# Patient Record
Sex: MALE | Race: WHITE | NOT HISPANIC OR LATINO | URBAN - METROPOLITAN AREA
[De-identification: names, ages, dates, MRNs, and addresses within clinical notes are randomized per-mention and may not be internally consistent; named-entity substitution may affect disease eponyms.]

---

## 2020-12-04 ENCOUNTER — EMERGENCY (EMERGENCY)
Facility: HOSPITAL | Age: 25
LOS: 1 days | Discharge: HOME | End: 2020-12-04
Attending: EMERGENCY MEDICINE | Admitting: EMERGENCY MEDICINE
Payer: COMMERCIAL

## 2020-12-04 VITALS
TEMPERATURE: 98 F | SYSTOLIC BLOOD PRESSURE: 122 MMHG | DIASTOLIC BLOOD PRESSURE: 75 MMHG | RESPIRATION RATE: 18 BRPM | HEART RATE: 81 BPM | OXYGEN SATURATION: 97 %

## 2020-12-04 VITALS
WEIGHT: 199.96 LBS | HEART RATE: 92 BPM | SYSTOLIC BLOOD PRESSURE: 143 MMHG | RESPIRATION RATE: 18 BRPM | TEMPERATURE: 98 F | OXYGEN SATURATION: 97 % | HEIGHT: 76 IN | DIASTOLIC BLOOD PRESSURE: 92 MMHG

## 2020-12-04 DIAGNOSIS — Z20.6 CONTACT WITH AND (SUSPECTED) EXPOSURE TO HUMAN IMMUNODEFICIENCY VIRUS [HIV]: ICD-10-CM

## 2020-12-04 DIAGNOSIS — Z11.3 ENCOUNTER FOR SCREENING FOR INFECTIONS WITH A PREDOMINANTLY SEXUAL MODE OF TRANSMISSION: ICD-10-CM

## 2020-12-04 LAB — HIV 1 & 2 AB SERPL IA.RAPID: SIGNIFICANT CHANGE UP

## 2020-12-04 PROCEDURE — 99283 EMERGENCY DEPT VISIT LOW MDM: CPT

## 2020-12-04 NOTE — ED PROVIDER NOTE - OBJECTIVE STATEMENT
26 yo M w/ no pertinent PMHx presents to the ED requesting STD/STI testing. Pt has no medical complaints currently, but notes his partner recently tested positive for HIV; last sexual encounter was 10/26 which was unprotected. Denies fever, chills, hematuria, abdominal pain, change in bowel function, flank pain, rash, HA, dizziness.

## 2020-12-04 NOTE — ED PROVIDER NOTE - PATIENT PORTAL LINK FT
You can access the FollowMyHealth Patient Portal offered by [facility] by registering at the following website: [email or web address] By joining [facility]’s FollowMyHealth portal, you will also be able to view your health information using other applications (apps) compatible with our system.

## 2020-12-04 NOTE — ED PROVIDER NOTE - CLINICAL SUMMARY MEDICAL DECISION MAKING FREE TEXT BOX
Pt presents to the ED requesting STD testing s/p exposure. Pt declined physical exam. Will obtain rapid HIV, syphilis screen, and GC culture. Abstinence and safe sex precautions given. Pt presents to the ED requesting STD testing s/p exposure. Pt declined physical exam as he reports no symptoms. Will obtain rapid HIV, syphilis screen, and GC culture. Abstinence and safe sex precautions given. PEP declined as patient reports his last sexual encounter was more than a month ago.

## 2020-12-04 NOTE — ED PROVIDER NOTE - NSFOLLOWUPINSTRUCTIONS_ED_ALL_ED_FT
HIV Antibody Test      Why am I having this test?    The HIV antibody test is used to screen for the human immunodeficiency virus (HIV), the virus that causes AIDS (acquired immunodeficiency syndrome).  The Centers for Disease Control and Prevention (CDC) recommends that everyone between the ages of 13 and 64 have this test at least once. Your health care provider may recommend this test if:  •You are pregnant or planning on becoming pregnant.      •You have had sex with someone who is or might be HIV-positive.      •You have had unprotected sex with more than one partner.      •You are a health worker and were exposed to HIV-infected blood.      •You have ever injected illegal drugs.      •You have ever exchanged sex for money or drugs.      •You have been diagnosed with hepatitis, tuberculosis, or a sexually transmitted infection (STI).      •You are a man who has sex with other men (MSM).      •You are exhibiting symptoms of AIDS.      •You had a blood transfusion before 1985.      It is possible to have HIV without any symptoms. There is no cure for HIV, but starting treatment early helps you stay healthy longer. If you know you have HIV (are HIV-positive), you can also make changes to prevent transferring the virus to other people.      What is being tested?    This test detects the proteins (antibodies) that your body’s defense system (immune system) makes to fight the infection. Having antibodies for HIV does not mean that you have AIDS or will get AIDS. HIV tests can be performed in a clinic, lab, or hospital setting. There are also home testing kits.      What kind of sample is taken?                The HIV antibody test requires either a blood sample or a sample of the fluid from inside your mouth (oral fluid).  •For the blood test, a blood sample is drawn from a vein in your hand or arm or by sticking a finger with a small needle.      •For the test using oral fluid, your upper and lower gums are swabbed.    If you decide to do a home HIV test, follow the package instructions carefully. There are two types of home testing kits:  •Blood test. You will send your test kit that includes a small sample of your blood to the  for processing. You will receive your results from the .    •Oral fluid test. This test is completed at home and usually gives you results in 20–40 minutes.  •Up to 1 in 12 infected people may have a false-negative result with the saliva test. This means that you could have a negative result even if you are infected.          How are the results reported?  •Your test results will be reported as either positive or negative for antibodies of HIV.  •Sometimes, the test results may report that a condition is present when it is not present (false-positive result).      •Sometimes, the test results may report that a condition is not present when it is present (false-negative result).        •Your health care provider will talk to you about doing more tests to confirm your results.        What do the results mean?    •If your HIV antibody test is negative, it means that there were no antibodies in your blood at the time of the test. It is important to know that it can take 1–3 months to develop antibodies after infection. If you may have been infected within the previous 3 months, your health care provider may recommend that you repeat the test after that time period.      •If your HIV antibody test is positive, it means that the test detected antibodies of HIV in your sample. You will need to have another type of test to confirm the results of the initial test. A positive result to the HIV antibody test does not necessarily mean that you will develop AIDS.      Talk with your health care provider about what your results mean.      Questions to ask your health care provider  Ask your health care provider, or the department that is doing the test:  •When will my results be ready?      •How will I get my results?      •What are my treatment options?      •What other tests do I need?      •What are my next steps?        Summary    •The HIV antibody test is used to screen for the human immunodeficiency virus (HIV), the virus that causes AIDS.      •It is possible to have HIV without any symptoms. There is no cure for HIV, but starting treatment early helps you stay healthy longer. If you know you have HIV (are HIV-positive), you can also make changes to prevent transferring the virus to other people.      •If your HIV antibody test is negative, it means that there were no antibodies in your blood at the time of the test. It is important to know that it can take 1–3 months to develop antibodies after infection. If you may have been infected within the previous 3 months, your health care provider may recommend that you repeat the test after that time period.      •If your HIV antibody test is positive, it means that the test detected antibodies of HIV in your sample. You will need to have another type of test to confirm the results of the initial test. A positive result to the HIV antibody test does not necessarily mean that you will develop AIDS.      This information is not intended to replace advice given to you by your health care provider. Make sure you discuss any questions you have with your health care provider.      Document Revised: 11/30/2018 Document Reviewed: 09/20/2018    [employer] Patient Education © 2020 [employer]

## 2020-12-05 LAB
C TRACH RRNA SPEC QL NAA+PROBE: SIGNIFICANT CHANGE UP
N GONORRHOEA RRNA SPEC QL NAA+PROBE: SIGNIFICANT CHANGE UP
SPECIMEN SOURCE: SIGNIFICANT CHANGE UP
T PALLIDUM AB TITR SER: NEGATIVE — SIGNIFICANT CHANGE UP